# Patient Record
Sex: FEMALE
[De-identification: names, ages, dates, MRNs, and addresses within clinical notes are randomized per-mention and may not be internally consistent; named-entity substitution may affect disease eponyms.]

---

## 2018-12-12 ENCOUNTER — HOSPITAL ENCOUNTER (EMERGENCY)
Dept: HOSPITAL 97 - ER | Age: 48
Discharge: HOME | End: 2018-12-12
Payer: COMMERCIAL

## 2018-12-12 DIAGNOSIS — R51: Primary | ICD-10-CM

## 2018-12-12 LAB
ALBUMIN SERPL BCP-MCNC: 3.1 G/DL (ref 3.4–5)
ALP SERPL-CCNC: 83 U/L (ref 45–117)
ALT SERPL W P-5'-P-CCNC: 18 U/L (ref 12–78)
AST SERPL W P-5'-P-CCNC: 10 U/L (ref 15–37)
BUN BLD-MCNC: 10 MG/DL (ref 7–18)
GLUCOSE SERPLBLD-MCNC: 125 MG/DL (ref 74–106)
HCT VFR BLD CALC: 37.7 % (ref 36–45)
LYMPHOCYTES # SPEC AUTO: 2.2 K/UL (ref 0.7–4.9)
MCH RBC QN AUTO: 28.4 PG (ref 27–35)
MCV RBC: 83.2 FL (ref 80–100)
PMV BLD: 8.9 FL (ref 7.6–11.3)
POTASSIUM SERPL-SCNC: 4 MMOL/L (ref 3.5–5.1)
RBC # BLD: 4.53 M/UL (ref 3.86–4.86)
TROPONIN (EMERG DEPT USE ONLY): < 0.02 NG/ML (ref 0–0.04)

## 2018-12-12 PROCEDURE — 71045 X-RAY EXAM CHEST 1 VIEW: CPT

## 2018-12-12 PROCEDURE — 36415 COLL VENOUS BLD VENIPUNCTURE: CPT

## 2018-12-12 PROCEDURE — 71275 CT ANGIOGRAPHY CHEST: CPT

## 2018-12-12 PROCEDURE — 86308 HETEROPHILE ANTIBODY SCREEN: CPT

## 2018-12-12 PROCEDURE — 84484 ASSAY OF TROPONIN QUANT: CPT

## 2018-12-12 PROCEDURE — 93005 ELECTROCARDIOGRAM TRACING: CPT

## 2018-12-12 PROCEDURE — 80048 BASIC METABOLIC PNL TOTAL CA: CPT

## 2018-12-12 PROCEDURE — 70450 CT HEAD/BRAIN W/O DYE: CPT

## 2018-12-12 PROCEDURE — 80076 HEPATIC FUNCTION PANEL: CPT

## 2018-12-12 PROCEDURE — 85025 COMPLETE CBC W/AUTO DIFF WBC: CPT

## 2018-12-12 PROCEDURE — 85379 FIBRIN DEGRADATION QUANT: CPT

## 2018-12-12 PROCEDURE — 87804 INFLUENZA ASSAY W/OPTIC: CPT

## 2018-12-12 NOTE — ER
Nurse's Notes                                                                                     

 [facility]                                                                

Name: Kathy Power                                                                            

Age: 48 yrs                                                                                       

Sex: Female                                                                                       

: 1970                                                                                   

MRN: [id number]                                                                                   

Arrival Date: 2018                                                                          

Time: 14:01                                                                                       

Account#: [id number]                                                                            

Bed 7                                                                                             

Private MD:                                                                                       

Diagnosis: Chest pain, unspecified;Headache                                                       

                                                                                                  

Presentation:                                                                                     

                                                                                             

14:03 Presenting complaint: Patient states: headache that began this morning around 0700 and  aa5 

      chest pain that began 2 hours PTA. Pt reports nausea, denies vomiting, denies SOB.          

14:03 Method Of Arrival: Ambulatory                                                           aa5 

14:03 Transition of care: patient was not received from another setting of care. Onset of     aa5 

      symptoms was 2018. Risk Assessment: Do you want to hurt yourself or            

      someone else? Patient reports no desire to harm self or others. Initial Sepsis Screen:      

      Does the patient meet any 2 criteria? No. Patient's initial sepsis screen is negative.      

      Does the patient have a suspected source of infection? No. Patient's initial sepsis         

      screen is negative. Care prior to arrival: None.                                            

14:03 Acuity: MADELYN 3                                                                           aa5 

                                                                                                  

Triage Assessment:                                                                                

14:09 General: Appears in no apparent distress. uncomfortable, Behavior is calm, cooperative, hj  

      appropriate for age. Pain: Complains of pain in chest Pain currently is 8 out of 10 on      

      a pain scale. Cardiovascular: Reports chest pain.                                           

                                                                                                  

OB/GYN:                                                                                           

14:05 LMP 2018                                                                          aa5 

                                                                                                  

Historical:                                                                                       

- Allergies:                                                                                      

14:04 No Known Allergies;                                                                     aa5 

- Home Meds:                                                                                      

14:04 None [Active];                                                                          aa5 

- PMHx:                                                                                           

14:04 None;                                                                                   aa5 

- PSHx:                                                                                           

14:04 gastric sleeve; Cholecystectomy;                                                        aa5 

                                                                                                  

- Immunization history:: Adult Immunizations unknown.                                             

- Social history:: Smoking status: Patient/guardian denies using tobacco.                         

- Ebola Screening: : No symptoms or risks identified at this time.                                

- Family history:: not pertinent.                                                                 

- Hospitalizations: : No recent hospitalization is reported.                                      

                                                                                                  

                                                                                                  

Screenin:09 Abuse screen: Denies threats or abuse. Denies injuries from another. Nutritional        hj  

      screening: No deficits noted. Tuberculosis screening: No symptoms or risk factors           

      identified. Fall Risk None identified.                                                      

                                                                                                  

Assessment:                                                                                       

14:00 General: Appears in no apparent distress. uncomfortable, Behavior is calm, cooperative, hj  

      appropriate for age. Neuro: Level of Consciousness is awake, alert, obeys commands,         

      Oriented to person, place, time, situation, Appropriate for age. Cardiovascular:            

      Reports chest pain. Respiratory: Airway is patent Respiratory effort is even,               

      unlabored, Respiratory pattern is regular, symmetrical. GI: No signs and/or symptoms        

      were reported involving the gastrointestinal system. : No signs and/or symptoms were      

      reported regarding the genitourinary system. EENT: No signs and/or symptoms were            

      reported regarding the EENT system. Derm: No signs and/or symptoms reported regarding       

      the dermatologic system. Musculoskeletal: No signs and/or symptoms reported regarding       

      the musculoskeletal system.                                                                 

14:10 Pain: Pain does not radiate. Pain began.                                                hj  

15:10 Reassessment: Patient and/or family updated on plan of care and expected duration. Pain hj  

      level reassessed. Patient is alert, oriented x 3, equal unlabored respirations, skin        

      warm/dry/pink. Patient states feeling better. Patient states symptoms have improved.        

15:55 Reassessment: Patient and/or family updated on plan of care and expected duration. Pain hj  

      level reassessed. Patient is alert, oriented x 3, equal unlabored respirations, skin        

      warm/dry/pink. awaiting POC:.                                                               

                                                                                                  

Vital Signs:                                                                                      

14:05  / 63; Pulse 65; Resp 16 S; Temp 97.0(TE); Pulse Ox 99% on R/A; Weight 99.79 kg   aa5 

      (R); Height 5 ft. 7 in. (170.18 cm) (R); Pain 8/10;                                         

15:55  / 70; Pulse 69; Resp 18; Pulse Ox 100% on R/A;                                   hj  

16:25  / 69; Pulse 69; Resp 18; Pulse Ox 100% on R/A;                                   hj  

14:05 Body Mass Index 34.46 (99.79 kg, 170.18 cm)                                             aa5 

                                                                                                  

ED Course:                                                                                        

14:01 Patient arrived in ED.                                                                  mr  

14:03 Arm band placed on.                                                                     aa5 

14:06 Vikram Mena, RN is Primary Nurse.                                                    hj  

14:07 Triage completed.                                                                       aa5 

14:10 Patient has correct armband on for positive identification. Placed in gown. Bed in low  hj  

      position. Call light in reach. Side rails up X 1. Cardiac monitor on.                       

14:10 Patient maintains SpO2 saturation greater than 95% on room air.                         hj  

14:13 Mak, Ariel, MD is Attending Physician.                                                rn  

14:20 Initial lab(s) drawn, by me, sent to lab. Inserted saline lock: 22 gauge in right       hj  

      antecubital area, using aseptic technique. Blood collected.                                 

14:42 EKG done, by EKG tech. reviewed by Ariel Mak MD.                                      at1 

14:44 CT Head Brain wo Cont In Process Unspecified.                                           EDMS

14:51 X-ray completed. Patient tolerated procedure well. Patient moved back from radiology.   mh1 

14:52 XRAY Chest (1 view) In Process Unspecified.                                             EDMS

15:09 Notified ED physician of a critical lab result(s). d-dimer 511.                         dm5 

15:26 Patient moved to CT.                                                                    mw3 

15:33 CT completed. Patient tolerated procedure well. Patient moved back from CT.             mw3 

15:34 CT Chest For PE Angio In Process Unspecified.                                           EDMS

16:24 No provider procedures requiring assistance completed. IV discontinued, intact,         hj  

      bleeding controlled, No redness/swelling at site. Pressure dressing applied.                

                                                                                                  

Administered Medications:                                                                         

No medications were administered                                                                  

                                                                                                  

                                                                                                  

Outcome:                                                                                          

16:06 Discharge ordered by MD.                                                                rn  

16:25 Discharged to home ambulatory, with family.                                             hj  

16:25 Condition: stable                                                                           

16:25 Discharge instructions given to patient, family, Instructed on discharge instructions,      

      follow up and referral plans. Demonstrated understanding of instructions, follow-up         

      care.                                                                                       

16:26 Patient left the ED.                                                                      

                                                                                                  

Signatures:                                                                                       

Dispatcher [employer]                           EDMS                                                 

Kathleen Tirado, RN                    RN   dm5                                                  

Keya Walsh Martha                               mh1                                                  

Ariel Mak MD MD rn Calderon, Audri RN                     RN   5                                                  

Katie Perez, EKG Tech              EKG Tat1                                                  

Vikram Mena RN RN hj Willis, Michelle                             mw3                                                  

                                                                                                  

**************************************************************************************************

## 2018-12-12 NOTE — RAD REPORT
EXAM DESCRIPTION:  CT - Chest For Pe Angio - 12/12/2018 3:34 pm

 

CLINICAL HISTORY:  Chest pain.

CHEST PAIN

 

COMPARISON:  No comparisons

 

TECHNIQUE:  CT angiogram of the pulmonary arteries was performed with MIP.

 

All CT scans are performed using dose optimization technique as appropriate and may include automated
 exposure control or mA/KV adjustment according to patient size.

 

FINDINGS:  No evidence of pulmonary thromboembolism.

 

No acute aortic finding demonstrated.

 

The lungs are clear.

 

No significant pericardial or pleural fluid.

 

No concerning bony finding. Postsurgical changes about the stomach noted.

 

IMPRESSION:  No evidence of pulmonary thromboembolism.

 

No acute lung findings.

## 2018-12-12 NOTE — EDPHYS
Physician Documentation                                                                           

 [facility]                                                                

Name: Kathy Power                                                                            

Age: 48 yrs                                                                                       

Sex: Female                                                                                       

: 1970                                                                                   

MRN: [id number]                                                                                   

Arrival Date: 2018                                                                          

Time: 14:01                                                                                       

Account#: [id number]                                                                            

Bed 7                                                                                             

Private MD:                                                                                       

ED Physician Ariel Mak                                                                         

HPI:                                                                                              

                                                                                             

15:58 This 48 yrs old  Female presents to ER via Ambulatory with complaints of Chest  rn  

      Pain, headache.                                                                             

15:59 The patient or guardian reports chest pain that is located primarily in the anterior    rn  

      chest wall, left. Onset: this morning. The pain does not radiate. Associated signs and      

      symptoms: Pertinent positives: headache, Pertinent negatives: abdominal pain, cough,        

      lower extremity swelling, lightheadedness, nausea, near syncope, palpitations, recent       

      travel, shortness of breath, syncope, vomiting. The chest pain is described as dull.        

      Duration: The patient or guardian reports multiple episodes, that are intermittent.         

      Severity of pain: At its worst the pain was mild in the emergency department the pain       

      has improved. The patient has not experienced similar symptoms in the past. Reports         

      this morning began with headache and chest "thumping", reports under a lot of stress        

      lately, no fever/cough/sob/trauma/abd pain/vomiting/diarrhea. Reports admitted here for     

      chest pain 1-2 years ago for chest pain, had neg stress and ECHO..                          

                                                                                                  

OB/GYN:                                                                                           

14:05 LMP 2018                                                                          aa5 

                                                                                                  

Historical:                                                                                       

- Allergies:                                                                                      

14:04 No Known Allergies;                                                                     aa5 

- Home Meds:                                                                                      

14:04 None [Active];                                                                          aa5 

- PMHx:                                                                                           

14:04 None;                                                                                   aa5 

- PSHx:                                                                                           

14:04 gastric sleeve; Cholecystectomy;                                                        aa5 

                                                                                                  

- Immunization history:: Adult Immunizations unknown.                                             

- Social history:: Smoking status: Patient/guardian denies using tobacco.                         

- Ebola Screening: : No symptoms or risks identified at this time.                                

- Family history:: not pertinent.                                                                 

- Hospitalizations: : No recent hospitalization is reported.                                      

                                                                                                  

                                                                                                  

ROS:                                                                                              

15:59 Constitutional: Negative for fever, chills, and weight loss, Eyes: Negative for injury, rn  

      pain, redness, and discharge, Neck: Negative for injury, pain, and swelling,                

      Cardiovascular: Negative for palpitations, and edema, Respiratory: Negative for             

      shortness of breath, cough, wheezing, and pleuritic chest pain, Abdomen/GI: Negative        

      for abdominal pain, nausea, vomiting, diarrhea, and constipation, MS/Extremity:             

      Negative for injury and deformity, Skin: Negative for injury, rash, and discoloration,      

      Neuro: Negative for weakness, numbness, tingling, and seizure.                              

                                                                                                  

Exam:                                                                                             

15:59 Constitutional:  This is a well developed, well nourished patient who is awake, alert,  rn  

      appears anxious Head/Face:  Normocephalic, atraumatic. Eyes:  Pupils equal round and        

      reactive to light, extra-ocular motions intact.  Lids and lashes normal.  Conjunctiva       

      and sclera are non-icteric and not injected.  Cornea within normal limits.  Periorbital     

      areas with no swelling, redness, or edema. ENT:  MMM, no stridor Cardiovascular:            

      Regular rate and rhythm with a normal S1 and S2.  No gallops, murmurs, or rubs.  No         

      JVD.  No pulse deficits. Respiratory:  Lungs have equal breath sounds bilaterally,          

      clear to auscultation.  No rales, rhonchi or wheezes noted.  No increased work of           

      breathing, no retractions or nasal flaring. Abdomen/GI:  soft, non-tender MS/               

      Extremity:  Pulses equal, no cyanosis.  Neurovascular intact.  Full, normal range of        

      motion.  Equal circumference. Neuro:  Awake and alert, GCS 15, oriented to person,          

      place, time, and situation.  Cranial nerves II-XII grossly intact.  Motor strength 5/5      

      in all extremities.  Sensory grossly intact.  Cerebellar exam normal.                       

                                                                                                  

Vital Signs:                                                                                      

14:05  / 63; Pulse 65; Resp 16 S; Temp 97.0(TE); Pulse Ox 99% on R/A; Weight 99.79 kg   aa5 

      (R); Height 5 ft. 7 in. (170.18 cm) (R); Pain 8/10;                                         

15:55  / 70; Pulse 69; Resp 18; Pulse Ox 100% on R/A;                                   hj  

16:25  / 69; Pulse 69; Resp 18; Pulse Ox 100% on R/A;                                   hj  

14:05 Body Mass Index 34.46 (99.79 kg, 170.18 cm)                                             aa5 

                                                                                                  

MDM:                                                                                              

14:13 Patient medically screened.                                                             rn  

15:59 Differential diagnosis: acute pericarditis, anxiety, coronary artery disease chest wall rn  

      pain, costochondritis, gastritis, gastroesophageal reflux disease (GERD), pleurisy,         

      pneumonia, pneumothorax, pulmonary embolus. Data reviewed: vital signs, nurses notes,       

      lab test result(s), EKG, radiologic studies, CT scan, plain films, and as a result, I       

      will discharge patient. Counseling: I had a detailed discussion with the patient and/or     

      guardian regarding: the historical points, exam findings, and any diagnostic results        

      supporting the discharge/admit diagnosis, lab results, radiology results, the need for      

      outpatient follow up, to return to the emergency department if symptoms worsen or           

      persist or if there are any questions or concerns that arise at home. Response to           

      treatment: the patient's symptoms have markedly improved after treatment, and as a          

      result, I will discharge patient. Special discussion: I discussed with the                  

      patient/guardian in detail that at this point there is no indication for admission to       

      the hospital. It is understood, however, that if the symptoms persist or worsen the         

      patient needs to return immediately for re-evaluation. Based on the history and exam        

      findings, there is no indication for further emergent testing or inpatient evaluation.      

      I discussed with the patient/guardian the need to see the primary care provider for         

      further evaluation of the symptoms.                                                         

                                                                                                  

                                                                                             

14:23 Order name: Basic Metabolic Panel; Complete Time: 15:                                 rn  

                                                                                             

14:23 Order name: CBC with Diff; Complete Time: 15:                                         rn  

                                                                                             

14:23 Order name: LFT's; Complete Time: 15:                                                 rn  

                                                                                             

14:23 Order name: Troponin (emerg Dept Use Only); Complete Time: 15:                        rn  

                                                                                             

14:23 Order name: D-Dimer; Complete Time: 15:                                               rn  

                                                                                             

14:23 Order name: Flu; Complete Time: 15:                                                   rn  

                                                                                             

14:23 Order name: XRAY Chest (1 view); Complete Time: 15:04                                   rn  

                                                                                             

14:23 Order name: EKG; Complete Time: 14:24                                                   rn  

                                                                                             

14:23 Order name: Cardiac monitoring; Complete Time: 14:26                                    rn  

                                                                                             

14:23 Order name: EKG - Nurse/Tech; Complete Time: 14:51                                      rn  

                                                                                             

14:23 Order name: IV Saline Lock; Complete Time: 14:                                        rn  

                                                                                             

14:23 Order name: CT Head Brain wo Cont; Complete Time: 15:04                                 rn  

                                                                                             

14:23 Order name: Mono Screen Profile; Complete Time: 15:51                                   rn  

                                                                                             

15:12 Order name: CT Chest For PE Angio; Complete Time: 15:51                                 rn  

                                                                                             

14:23 Order name: Labs collected and sent; Complete Time: 14:26                               rn  

                                                                                             

14:23 Order name: O2 Per Protocol; Complete Time: 14:26                                       rn  

                                                                                             

14:23 Order name: O2 Sat Monitoring; Complete Time: 14:27                                     rn  

                                                                                                  

Administered Medications:                                                                         

No medications were administered                                                                  

                                                                                                  

                                                                                                  

Disposition:                                                                                      

18 16:06 Discharged to Home. Impression: Chest pain, unspecified, Headache.                 

- Condition is Stable.                                                                            

- Discharge Instructions: Nonspecific Chest Pain, General Headache Without Cause,                 

  Stress and Stress Management.                                                                   

                                                                                                  

- Medication Reconciliation Form, Thank You Letter, Antibiotic Education, Prescription            

  Opioid Use form.                                                                                

- Follow up: Private Physician; When: As needed; Reason: Recheck today's complaints,              

  Re-evaluation by your physician.                                                                

- Problem is new.                                                                                 

- Symptoms have improved.                                                                         

                                                                                                  

                                                                                                  

                                                                                                  

Signatures:                                                                                       

Dispatcher [employer]                           EDMS                                                 

Ariel Mak MD MD rn Calderon, Audri, RN                     RN   aa5                                                  

Vikram Mena RN RN   hj                                                   

                                                                                                  

Corrections: (The following items were deleted from the chart)                                    

16:26 16:06 2018 16:06 Discharged to Home. Impression: Chest pain, unspecified;         hj  

      Headache. Condition is Stable. Forms are Medication Reconciliation Form, Thank You          

      Letter, Antibiotic Education, Prescription Opioid Use. Follow up: Private Physician;        

      When: As needed; Reason: Recheck today's complaints, Re-evaluation by your physician.       

      Problem is new. Symptoms have improved. rn                                                  

                                                                                                  

**************************************************************************************************

## 2018-12-12 NOTE — XMS REPORT
[facility]

 Created on:2018



Patient:Kathy Power

Sex:Female

:1970

External Reference #:[id number]





Demographics







 Address  [address] [place] [address]

 

 Phone  [phone]

 

 Preferred Language  en

 

 Marital Status  Unknown

 

 [employer] Affiliation  Unknown

 

 Race  Other Race

 

 Ethnic Group  Unknown









Author







 Organization  [employer]









Care Team Providers







 Name  Role  Phone

 

 Fazal Min  Provider Role  Unavailable









Allergies, Adverse Reactions, Alerts







 Substance  Reaction  Event Type

 

 N.K.D.A.  Info Not Available  Non Drug Allergy







Problems







 Problem Type  Condition  Code  Onset Dates  Condition Status

 

 Assessment  Body mass index (BMI) of 34.0-34.9  Z68.34    Active



   in adult      

 

 Assessment  Encounter for screening mammogram  Z12.31    Active



   for breast cancer      

 

 Assessment  Other obesity due to excess  E66.09    Active



   calories      

 

 Problem  Other obesity due to excess  E66.09    Active



   calories      

 

 Problem  Encounter for screening mammogram  Z12.31    Active



   for breast cancer      

 

 Problem  Abnormal mammogram  R92.8    Active

 

 Assessment  Encounter for gynecological  Z01.419    Active



   examination without abnormal      



   finding      

 

 Problem  Encounter for gynecological  Z01.419    Active



   examination without abnormal      



   finding      

 

 Problem  Body mass index (BMI) of 34.0-34.9  Z68.34    Active



   in adult      







Medications

No Known Medications



Results







 Name  Result  Date  Reference Range  Unit  Abnormality Flag

 

 URINALYSIS AUTO W/O SCOPE          



 (39111)          

 

 ----NIT  neg  [id number]      

 

 ----URO  0.2  [id number]      

 

 ----PROTEIN  neg  [id number]      

 

 ----pH  6.5  [id number]      

 

 ----BLO  trace-intact  [id number]      

 

 ----GLUCOSE  neg  [id number]      

 

 ----PARRISH  neg  [id number]      

 

 ----BILIRUBIN  neg  [id number]      

 

 ----KETONES  neg  [id number]      

 

 ----SPECIFIC GRAVITY  1.015  [id number]      







Summary Purpose

[employer] Submission

## 2018-12-12 NOTE — RAD REPORT
EXAM DESCRIPTION:  RAD - Chest Single View - 12/12/2018 2:53 pm

 

CLINICAL HISTORY:  CHEST PAIN

Chest pain.

 

COMPARISON:  Chest Single View dated 5/22/2016

 

FINDINGS:  Portable technique limits examination quality.

 

The lungs are grossly clear. The heart is normal in size. No displaced fractures.

 

IMPRESSION:  No acute intrathoracic process suspected.

## 2018-12-12 NOTE — RAD REPORT
EXAM DESCRIPTION:  CT - Head Brain Wo Cont - 12/12/2018 2:45 pm

 

CLINICAL HISTORY:  Headache;Dizziness

Drowsiness

 

COMPARISON:  No comparisons

 

TECHNIQUE:  All CT scans are performed using dose optimization technique as appropriate and may inclu
de automated exposure control or mA/KV adjustment according to patient size.

 

FINDINGS:  No intracranial hemorrhage, hydrocephalus or extra-axial fluid collection.No areas of brai
n edema or evidence of midline shift.

 

The paranasal sinuses and mastoids are clear. The calvarium is intact.

 

IMPRESSION:  No acute intracranial abnormality.

## 2018-12-13 NOTE — EKG
Test Date:    2018-12-12               Test Time:    14:35:43

Technician:   YORDY                                     

                                                     

MEASUREMENT RESULTS:                                       

Intervals:                                           

Rate:         57                                     

KY:           156                                    

QRSD:         76                                     

QT:           436                                    

QTc:          424                                    

Axis:                                                

P:            52                                     

KY:           156                                    

QRS:          12                                     

T:            43                                     

                                                     

INTERPRETIVE STATEMENTS:                                       

                                                     

Sinus bradycardia

Otherwise normal ECG

Compared to ECG 05/22/2016 11:31:45

Sinus rhythm no longer present



Electronically Signed On 12-13-18 05:15:19 CST by Nathaniel Scruggs

## 2022-05-05 ENCOUNTER — HOSPITAL ENCOUNTER (OUTPATIENT)
Dept: HOSPITAL 97 - ER | Age: 52
Setting detail: OBSERVATION
LOS: 1 days | Discharge: HOME | End: 2022-05-06
Attending: SURGERY | Admitting: SURGERY
Payer: COMMERCIAL

## 2022-05-05 VITALS — BODY MASS INDEX: 38.8 KG/M2

## 2022-05-05 DIAGNOSIS — N61.1: Primary | ICD-10-CM

## 2022-05-05 DIAGNOSIS — Z90.49: ICD-10-CM

## 2022-05-05 DIAGNOSIS — Z98.84: ICD-10-CM

## 2022-05-05 DIAGNOSIS — Z83.3: ICD-10-CM

## 2022-05-05 DIAGNOSIS — K21.9: ICD-10-CM

## 2022-05-05 DIAGNOSIS — E66.9: ICD-10-CM

## 2022-05-05 DIAGNOSIS — Z20.822: ICD-10-CM

## 2022-05-05 LAB
ALBUMIN SERPL BCP-MCNC: 3.1 G/DL (ref 3.4–5)
ALP SERPL-CCNC: 75 U/L (ref 45–117)
ALT SERPL W P-5'-P-CCNC: 28 U/L (ref 12–78)
AST SERPL W P-5'-P-CCNC: 14 U/L (ref 15–37)
BUN BLD-MCNC: 13 MG/DL (ref 7–18)
GLUCOSE SERPLBLD-MCNC: 86 MG/DL (ref 74–106)
HCT VFR BLD CALC: 35.8 % (ref 36–45)
LYMPHOCYTES # SPEC AUTO: 3.1 K/UL (ref 0.7–4.9)
PMV BLD: 8.3 FL (ref 7.6–11.3)
POTASSIUM SERPL-SCNC: 3.9 MMOL/L (ref 3.5–5.1)
RBC # BLD: 4.45 M/UL (ref 3.86–4.86)

## 2022-05-05 PROCEDURE — 87070 CULTURE OTHR SPECIMN AEROBIC: CPT

## 2022-05-05 PROCEDURE — 85025 COMPLETE CBC W/AUTO DIFF WBC: CPT

## 2022-05-05 PROCEDURE — 87075 CULTR BACTERIA EXCEPT BLOOD: CPT

## 2022-05-05 PROCEDURE — 71045 X-RAY EXAM CHEST 1 VIEW: CPT

## 2022-05-05 PROCEDURE — 99284 EMERGENCY DEPT VISIT MOD MDM: CPT

## 2022-05-05 PROCEDURE — 96375 TX/PRO/DX INJ NEW DRUG ADDON: CPT

## 2022-05-05 PROCEDURE — 87205 SMEAR GRAM STAIN: CPT

## 2022-05-05 PROCEDURE — 10060 I&D ABSCESS SIMPLE/SINGLE: CPT

## 2022-05-05 PROCEDURE — 80053 COMPREHEN METABOLIC PANEL: CPT

## 2022-05-05 PROCEDURE — 36415 COLL VENOUS BLD VENIPUNCTURE: CPT

## 2022-05-05 PROCEDURE — 93005 ELECTROCARDIOGRAM TRACING: CPT

## 2022-05-05 PROCEDURE — 96365 THER/PROPH/DIAG IV INF INIT: CPT

## 2022-05-05 PROCEDURE — 0HBT0ZX EXCISION OF RIGHT BREAST, OPEN APPROACH, DIAGNOSTIC: ICD-10-PCS

## 2022-05-05 PROCEDURE — 88304 TISSUE EXAM BY PATHOLOGIST: CPT

## 2022-05-05 PROCEDURE — 19101 BIOPSY OF BREAST OPEN: CPT

## 2022-05-05 PROCEDURE — 80048 BASIC METABOLIC PNL TOTAL CA: CPT

## 2022-05-05 PROCEDURE — 0H9T0ZZ DRAINAGE OF RIGHT BREAST, OPEN APPROACH: ICD-10-PCS

## 2022-05-05 PROCEDURE — 96361 HYDRATE IV INFUSION ADD-ON: CPT

## 2022-05-05 RX ADMIN — SODIUM CHLORIDE SCH MLS: 0.9 INJECTION, SOLUTION INTRAVENOUS at 21:32

## 2022-05-05 NOTE — P.HP
Date of Service: 05/05/22


Chief complaint: Right breast pain





History of present Illness: Patient is a 51-year-old female who for the last 

week and a half has had a increasing mass associated with the redness, warmth 

and tenderness.  Patient had a mammogram and ultrasound done yesterday which was

suspicious for an abscess.  She went to her gynecologist's office today for 

follow-up and was found to have an abscess.  Patient presented to the emergency 

room for evaluation.  Her blood work is pending.  Patient denies any sore 

throat, runny nose, cough, headaches, dizziness, chest pain or fever or chills. 

Patient denies any nipple discharge or skin color changes.  Patient denies any 

trauma or insect bites.  Patient denies family history of breast cancer.





Review of systems: Otherwise unremarkable





Past medical history: Negative





Past surgical history: Gastric sleeve surgery and cholecystectomy





Allergies: None





Social history: Patient denies smoking tobacco or drinking alcohol





Family history: Diabetes





Vital signs: Stable, afebrile





Physical exam:


Awake alert oriented x3


Head and neck: Cranial nerves II through XII grossly within normal limits, th

roat clear, neck supple, no JVD and no neck masses


Chest: Clear


Heart: S1-S2


Abdomen: Soft


Extremity: Neurovascularly intact and nontender with full range of motion


Neuro: Nonfocal


Axilla: No masses


Breast: No nipple discharge, no skin color changes except on the right breast in

the upper outer area approximately 5 x 5 cm with redness, warmth and central 

fluctuance with surrounding induration.  No mass appreciated in the left breast.





Diagnostic data: Mammogram and ultrasound reviewedpatient has a complex cyst 

that was seen yesterday, approximately 2.9 cm in the right breast.  Patient's 

white count is slightly elevated 11.4





Assessment: Right breast abscess with cellulitis





Plan/recommendation: Admit, n.p.o., IV fluids, IV antibiotics and to the OR for 

incision and drainage and debridement of right breast abscess.  We will also do 

a biopsy of the abscess cavity.  Patient understands risk, benefits, 

alternatives and agrees to procedure.  Plan of care discussed with patient in 

detail.





CC: Dr. Louis's office

## 2022-05-05 NOTE — ER
Nurse's Notes                                                                                     

 [facility]                                                                 

Name: Kathy Power                                                                            

Age: 51 yrs                                                                                       

Sex: Female                                                                                       

: 1970                                                                                   

MRN: [id number]                                                                                   

Arrival Date: 2022                                                                          

Time: 16:16                                                                                       

Account#: [id number]                                                                            

Bed 20                                                                                            

Private MD: Julian Mclaughlin E                                                                     

Diagnosis: Cutaneous abscess, unspecified-right breast 3 cm                                       

                                                                                                  

Presentation:                                                                                     

                                                                                             

16:35 Chief complaint: Patient states: "About 2 weeks ago I noticed a lump on the right       ab2 

      breast so I called my doctor and she scheduled a mammogram and ultrasound, so I had         

      that done yesterday. The lump has since tripled in size and my doctor told me to come       

      in." Pt c/o right breast pain. Coronavirus screen: Vaccine status: Patient reports          

      receiving the 2nd dose of the covid vaccine. Client denies travel out of the [place] in        

      the last 14 days. At this time, the client does not indicate any symptoms associated        

      with coronavirus-19. Ebola Screen: Patient negative for fever greater than or equal to      

      101.5 degrees Fahrenheit, and additional compatible Ebola Virus Disease symptoms            

      Patient denies exposure to infectious person. Patient denies travel to an                   

      Ebola-affected area in the 21 days before illness onset. No symptoms or risks               

      identified at this time. Initial Sepsis Screen: Does the patient meet any 2 criteria?       

      No. Patient's initial sepsis screen is negative. Does the patient have a suspected          

      source of infection? No. Patient's initial sepsis screen is negative. Risk Assessment:      

      Do you want to hurt yourself or someone else? Patient reports no desire to harm self or     

      others. Onset of symptoms is unknown.                                                       

16:35 Method Of Arrival: Ambulatory                                                           ab2 

16:35 Acuity: MADELYN 3                                                                           ab2 

                                                                                                  

Triage Assessment:                                                                                

16:37 General: Appears in no apparent distress. uncomfortable, Behavior is calm, cooperative, ab2 

      appropriate for age. Pain: Complains of pain in right breast Pain currently is 4 out of     

      10 on a pain scale. Neuro: Level of Consciousness is awake, alert, obeys commands,          

      Oriented to person, place, time, situation, Appropriate for age  are equal             

      bilaterally. Respiratory: Airway is patent Respiratory effort is even, unlabored.           

                                                                                                  

Historical:                                                                                       

- Allergies:                                                                                      

16:37 No Known Allergies;                                                                     ab2 

- PMHx:                                                                                           

16:37 None;                                                                                   ab2 

                                                                                                  

- Immunization history:: Adult Immunizations up to date.                                          

- Social history:: Smoking status: Patient denies any tobacco usage or history of.                

- Family history:: not pertinent.                                                                 

                                                                                                  

                                                                                                  

Assessment:                                                                                       

19:10 Reassessment: Patient appears in no apparent distress at this time. Patient and/or      jb4 

      family updated on plan of care and expected duration. Pain level reassessed. Patient is     

      alert, oriented x 3, equal unlabored respirations, skin warm/dry/pink.                      

20:00 Reassessment: Patient appears in no apparent distress at this time. Patient and/or      jb4 

      family updated on plan of care and expected duration. Pain level reassessed. Patient is     

      alert, oriented x 3, equal unlabored respirations, skin warm/dry/pink.                      

21:00 Reassessment: Patient appears in no apparent distress at this time. Patient and/or      jb4 

      family updated on plan of care and expected duration. Pain level reassessed. Patient is     

      alert, oriented x 3, equal unlabored respirations, skin warm/dry/pink.                      

                                                                                                  

Vital Signs:                                                                                      

16:35  / 66; Pulse 73; Resp 17; Temp 98.7; Pulse Ox 99% on R/A; Weight 112.49 kg;       ab2 

      Height 5 ft. 7 in. (170.18 cm); Pain 4/10;                                                  

16:57  / 50; Pulse 68; Resp 16; Temp 98; Pulse Ox 100% ; Weight 112.49 kg; Height 5 ft. zm  

      7 in. (170.18 cm);                                                                          

16:57 Body Mass Index 38.84 (112.49 kg, 170.18 cm)                                              

                                                                                                  

ED Course:                                                                                        

16:16 Patient arrived in ED.                                                                  am2 

16:17 Julian Mclaughlin MD is Private Physician.                                                am2 

16:18 Rubén Francois MD is Attending Physician.                                             robert 

16:37 Triage completed.                                                                       ab2 

16:38 Arm band placed on right wrist.                                                         ab2 

16:43 Jordan Baker, RN is Primary Nurse.                                                    bp  

17:20 Inserted saline lock: 22 gauge in left forearm, using aseptic technique. Blood          bp  

      collected.                                                                                  

17:31 Dylon Motta MD is Hospitalizing Provider.                                              robert 

17:50 Chest Single View XRAY In Process Unspecified.                                          EDMS

                                                                                                  

Administered Medications:                                                                         

17:20 Drug: NS 0.9% 1000 ml Route: IV; Rate: 1 bolus; Site: left forearm;                     bp  

19:11 Follow up: IV Status: Completed infusion; IV Intake: 1000ml                             bp  

17:20 Drug: morphine 2 mg Route: IVP; Site: left forearm;                                     bp  

18:02 Follow up: Response: Pain is decreased                                                  bp  

17:20 Drug: Zofran (Ondansetron) 4 mg Route: IVP; Site: left forearm;                         bp  

18:02 Follow up: Response: No adverse reaction                                                bp  

17:45 Drug: Ancef (cefazolin) 2 grams Route: IVPB; Infused Over: 30 mins; Site: left forearm; bp  

18:02 Follow up: IV Status: Completed infusion; IV Intake: 100ml                              bp  

                                                                                                  

                                                                                                  

Intake:                                                                                           

18:02 IV: 100ml; Total: 100ml.                                                                bp  

19:11 IV: 1000ml; Total: 1100ml.                                                              bp  

                                                                                                  

Outcome:                                                                                          

17:32 Decision to Hospitalize by Provider.                                                    robert 

                                                                                             

06:55 Patient left the ED.                                                                    [id number] 

                                                                                                  

Signatures:                                                                                       

Dispatcher [employer]                           EDRubén Morton MD MD cha Pena, Laura, RN                         RN   [id number]                                                  

Laureano Frazier RN                       RN   jb4                                                  

Katie Centeno Brian, NORMA                      RN   Scott Aguiar Zaina zm                                                   

                                                                                                  

**************************************************************************************************

## 2022-05-05 NOTE — EDPHYS
Physician Documentation                                                                           

 [facility]                                                                 

Name: Kathy Power                                                                            

Age: 51 yrs                                                                                       

Sex: Female                                                                                       

: 1970                                                                                   

MRN: [id number]                                                                                   

Arrival Date: 2022                                                                          

Time: 16:16                                                                                       

Account#: [id number]                                                                            

Bed 20                                                                                            

Private MD: Julian Mclaughlin E                                                                     

ED Physician Rubén Francois                                                                      

HPI:                                                                                              

                                                                                             

17:26 This 51 yrs old  Female presents to ER via Ambulatory with complaints of Cyst - robert 

      right breast.                                                                               

17:26 The patient presents with an abscess of the right breast, The patient presents with     robert 

      cellulitis of the right breast. Description: The affected area is small, confluent.         

      Onset: The symptoms/episode began/occurred 2 week(s) ago. Possible cause(s): unknown.       

      Associated signs and symptoms: The patient has no apparent associated signs or              

      symptoms. Modifying factors: the symptoms are alleviated by nothing, the symptoms are       

      aggravated by pressure, squeezing the lesion and expressing the contents. Severity of       

      symptoms: At their worst the symptoms were moderate, in the emergency department the        

      symptoms are unchanged. The patient has not experienced similar symptoms in the past.       

                                                                                                  

Historical:                                                                                       

- Allergies:                                                                                      

16:37 No Known Allergies;                                                                     ab2 

- PMHx:                                                                                           

16:37 None;                                                                                   ab2 

                                                                                                  

- Immunization history:: Adult Immunizations up to date.                                          

- Social history:: Smoking status: Patient denies any tobacco usage or history of.                

- Family history:: not pertinent.                                                                 

                                                                                                  

                                                                                                  

ROS:                                                                                              

17:26 Constitutional: Negative for fever, chills, and weight loss, Eyes: Negative for injury, robert 

      pain, redness, and discharge, ENT: Negative for injury, pain, and discharge, Neck:          

      Negative for injury, pain, and swelling, Cardiovascular: Negative for chest pain,           

      palpitations, and edema, Respiratory: Negative for shortness of breath, cough,              

      wheezing, and pleuritic chest pain, Abdomen/GI: Negative for abdominal pain, nausea,        

      vomiting, diarrhea, and constipation, Back: Negative for injury and pain, : Negative      

      for injury, bleeding, discharge, and swelling, MS/Extremity: Negative for injury and        

      deformity, Neuro: Negative for headache, weakness, numbness, tingling, and seizure,         

      Psych: Negative for depression, anxiety, suicide ideation, homicidal ideation, and          

      hallucinations, Allergy/Immunology: Negative for hives, rash, and allergies, Endocrine:     

      Negative for neck swelling, polydipsia, polyuria, polyphagia, and marked weight             

      changes, Hematologic/Lymphatic: Negative for swollen nodes, abnormal bleeding, and          

      unusual bruising.                                                                           

17:26 Skin: Positive for abscess, of the right breast.                                            

                                                                                                  

Exam:                                                                                             

17:26 Constitutional:  This is a well developed, well nourished patient who is awake, alert,  robert 

      and in no acute distress. Head/Face:  Normocephalic, atraumatic. Eyes:  Pupils equal        

      round and reactive to light, extra-ocular motions intact.  Lids and lashes normal.          

      Conjunctiva and sclera are non-icteric and not injected.  Cornea within normal limits.      

      Periorbital areas with no swelling, redness, or edema. ENT:  Nares patent. No nasal         

      discharge, no septal abnormalities noted.  Tympanic membranes are normal and external       

      auditory canals are clear.  Oropharynx with no redness, swelling, or masses, exudates,      

      or evidence of obstruction, uvula midline.  Mucous membranes moist. Neck:  Trachea          

      midline, no thyromegaly or masses palpated, and no cervical lymphadenopathy.  Supple,       

      full range of motion without nuchal rigidity, or vertebral point tenderness.  No            

      Meningismus. Chest/axilla:  Normal chest wall appearance and motion.  Nontender with no     

      deformity.  No lesions are appreciated. Cardiovascular:  Regular rate and rhythm with a     

      normal S1 and S2.  No gallops, murmurs, or rubs.  Normal PMI, no JVD.  No pulse             

      deficits. Respiratory:  Lungs have equal breath sounds bilaterally, clear to                

      auscultation and percussion.  No rales, rhonchi or wheezes noted.  No increased work of     

      breathing, no retractions or nasal flaring. Abdomen/GI:  Soft, non-tender, with normal      

      bowel sounds.  No distension or tympany.  No guarding or rebound.  No evidence of           

      tenderness throughout. Back:  No spinal tenderness.  No costovertebral tenderness.          

      Full range of motion. Pelvic Exam:  Normal external genitalia.  Speculum exam with          

      closed cervical os, no discharge or bleeding noted.  Bimanual exam with normal adnexa,      

      no adnexal or cervical motion tenderness.  Normal uterus. MS/ Extremity:  Pulses equal,     

      no cyanosis.  Neurovascular intact.  Full, normal range of motion. Neuro:  Awake and        

      alert, GCS 15, oriented to person, place, time, and situation.  Cranial nerves II-XII       

      grossly intact.  Motor strength 5/5 in all extremities.  Sensory grossly intact.            

      Cerebellar exam normal.  Normal gait. Psych:  Awake, alert, with orientation to person,     

      place and time.  Behavior, mood, and affect are within normal limits.                       

17:26 Skin: abscess, that is moderate sized, approximately  3 cm(s), cellulitis, that is          

      mild, induration, that is mild is noted.                                                    

18:24 ECG was reviewed by the Attending Physician.                                            [facility] 

                                                                                                  

Vital Signs:                                                                                      

16:35  / 66; Pulse 73; Resp 17; Temp 98.7; Pulse Ox 99% on R/A; Weight 112.49 kg;       ab2 

      Height 5 ft. 7 in. (170.18 cm); Pain 4/10;                                                  

16:57  / 50; Pulse 68; Resp 16; Temp 98; Pulse Ox 100% ; Weight 112.49 kg; Height 5 ft. zm  

      7 in. (170.18 cm);                                                                          

16:57 Body Mass Index 38.84 (112.49 kg, 170.18 cm)                                            zm  

                                                                                                  

MDM:                                                                                              

16:18 Patient medically screened.                                                             [facility] 

17:30 Differential diagnosis: abscess, cellulitis. Data reviewed: vital signs, nurses notes,  [facility] 

      lab test result(s), radiologic studies, plain films, ultrasound. Data interpreted:          

      Cardiac monitor: rate is 68 beats/min, rhythm is regular, Pulse oximetry: on room air       

      is 100 %. Counseling: I had a detailed discussion with the patient and/or guardian          

      regarding: the historical points, exam findings, and any diagnostic results supporting      

      the discharge/admit diagnosis, lab results, radiology results, the need for further         

      work-up and treatment in the hospital.                                                      

                                                                                                  

                                                                                             

16:19 Order name: CBC with Diff; Complete Time: 18:18                                         [facility] 

                                                                                             

16:19 Order name: Comprehensive Metabolic Panel; Complete Time: 18:18                         [facility] 

                                                                                             

17:50 Order name: COVID-19 SARS RT PCR (Document "Date of Onset" if Symptomatic)              bd  

                                                                                             

03:27 Order name: CBC with Automated Diff                                                     [facility]

                                                                                             

03:45 Order name: Basic Metabolic Panel                                                       [facility]

                                                                                             

16:23 Order name: Chest Single View XRAY; Complete Time: 18:18                                [facility] 

                                                                                             

16:23 Order name: EKG; Complete Time: 16:24                                                   [facility] 

                                                                                             

16:23 Order name: EKG - Nurse/Tech; Complete Time: 18:16                                      robert 

                                                                                                  

EC:24 Rate is 63 beats/min. Rhythm is regular. QRS Axis is Normal. HI interval is normal. QRS robert 

      interval is normal. QT interval is normal. No Q waves. T waves are Normal. No ST            

      changes noted. Clinical impression: NSR w/ Non-specific ST/T Changes and No evidence of     

      ischemia. Interpreted by me. Reviewed by me.                                                

                                                                                                  

Administered Medications:                                                                         

17:20 Drug: NS 0.9% 1000 ml Route: IV; Rate: 1 bolus; Site: left forearm;                     bp  

19:11 Follow up: IV Status: Completed infusion; IV Intake: 1000ml                             bp  

17:20 Drug: morphine 2 mg Route: IVP; Site: left forearm;                                     bp  

18:02 Follow up: Response: Pain is decreased                                                  bp  

17:20 Drug: Zofran (Ondansetron) 4 mg Route: IVP; Site: left forearm;                         bp  

18:02 Follow up: Response: No adverse reaction                                                bp  

17:45 Drug: Ancef (cefazolin) 2 grams Route: IVPB; Infused Over: 30 mins; Site: left forearm; bp  

18:02 Follow up: IV Status: Completed infusion; IV Intake: 100ml                              bp  

                                                                                                  

                                                                                                  

Disposition Summary:                                                                              

22 17:32                                                                                    

Hospitalization Ordered                                                                           

      Hospitalization Status: Observation                                                     robert 

      Provider: Dylon Motta cha 

      Condition: Stable                                                                       robert 

      Problem: new                                                                            robert 

      Symptoms: have improved                                                                 robert 

      Bed/Room Type: Standard                                                                 robert 

      Location: [facility] ER HOLD(22 19:52)                                                    

      Room Assignment: ERHOLD-(22 19:52)                                                  

      Diagnosis                                                                                   

        - Cutaneous abscess, unspecified - right breast 3 cm                                  robert 

      Forms:                                                                                      

        - Medication Reconciliation Form                                                      robert 

        - SBAR form                                                                           robert 

Signatures:                                                                                       

Dispatcher [employer]                           EDMS                                                 

Jocelyn Mora, RN                        RN   Rubén Gallo MD MD cha Peltier, Brian RN                      RN   Scott Aguiar                                                  

                                                                                                  

Corrections: (The following items were deleted from the chart)                                    

16:29 16:23 Extrmty Nonvasular Limited+US.RAD.BRZ ordered. EDMS                               EDMS

19:52 17:32 Telemetry/MedSurg (observation) [facility]  

19:52 17:32 robert                                                                                 

                                                                                                  

**************************************************************************************************

## 2022-05-05 NOTE — RAD REPORT
EXAM DESCRIPTION:  RAD - Chest Single View - 5/5/2022 5:48 pm

 

CLINICAL HISTORY:  COUGH

 

COMPARISON:  Chest Single View dated 12/12/2018; Chest Single View dated 5/22/2016

 

FINDINGS:  Lines: None.

Lungs: No evidence of edema or pneumonia.

Pleural: No significant pleural effusions or pneumothorax.

Cardiac: The heart size is within normal limits.

Bones: No acute fractures.

Other:

 

IMPRESSION:  No acute cardiopulmonary disease.

## 2022-05-05 NOTE — XMS REPORT
Continuity of Care Document

                             Created on:May 5, 2022



Patient:DULCE MIRANDA

Sex:Female

:1970

External Reference #:[id number]





Demographics







                          Address                   [address]



                                                    [place] [address]

 

                          Home Phone                [phone]

 

                          Work Phone                [phone]

 

                          Mobile Phone              [phone]

 

                          Email Address             [email or web address]

 

                          Preferred Language        English

 

                          Marital Status            Unknown

 

                          Pentecostal Affiliation     Unknown

 

                          Race                      Unknown

 

                          Additional Race(s)        Unavailable



                                                    White

 

                          Ethnic Group               or 









Author







                          Organization              [facility]

t

 

                          Address                   [address]



                                                    [place] [address]

 

                          Phone                     [phone]









Support







                Name            Relationship    Address         Phone

 

                Benja Spouse    Spouse          [address]     [phone]



                                                [place] [address] 

 

                Sister Brett   Unavailable     Unavailable     [phone]

 

                Luera           Mother          Unavailable     Unavailable









Care Team Providers







                    Name                Role                Phone

 

                    YI Mclaughlin           Primary Care Physician [phone]

 

                    Doctor Unassigned,  Name Attending Clinician Unavailable









Payers







           Payer Name Policy Type Policy Number Effective Date Expiration Date S

ource







Problems







       Condition Condition Condition Status Onset  Resolution Last   Treating Co

mments 

Source



       Name   Details Category        Date   Date   Treatment Clinician        



                                                 Date                 

 

       Body mass Body mass Problem Active                                    [facility]



       index  index                                                   [facility]



       (BMI) of (BMI) of                                                  [facility]

a



       34.0-34.9 34.0-34.9                                                  l



       in adult in adult                                                  Outpat

i



                                                                      ent



                                                                      Clinics

 

       Encounter Encounter Problem Active                                    [facility]



       for    for                                                     [facility]



       screening screening                                                  [place]

aidan



       mammogram mammogram                                                  l



       for breast for breast                                                  Ou

tpati



       cancer cancer                                                  ent



                                                                      Clinics

 

       Other  Other  Problem Active                                    [facility]



       obesity obesity                                                  [facility]



       due to due to                                                  [facility]



       excess excess                                                  l



       calories calories                                                  Outpat

i



                                                                      ent



                                                                      Clinics

 

       Abnormal Abnormal Problem Active                                    [facility]

t



       mammogram mammogram                                                  [facility]

s -



                                                                      [facility]



                                                                      l



                                                                      Outpati



                                                                      ent



                                                                      Clinics

 

       Encounter Encounter Problem Active                                    [facility]



       for    for                                                     [facility]



       gynecologi gynecologi                                                  Me

moria



       tona    tona                                                     l



       examinatio examinatio                                                  Ou

tpati



       n without n without                                                  ent



       abnormal abnormal                                                  Clinic

s



       finding finding                                                  

 

       Medical Medical Problem Active                                    [facility]



       history history                                                  [facility]



       non-contri non-contri                                                  Me

moria



       butory butory                                                  l



                                                                      Outpati



                                                                      ent



                                                                      Clinics

 

       No known No known Disease                                           Unive

rs



       active active                                                  ity of



       problems problems                                                  [place]



                                                                      Medical



                                                                      Branch







Allergies, Adverse Reactions, Alerts

This patient has no known allergies or adverse reactions.



Social History







           Social Habit Start Date Stop Date  Quantity   Comments   Source

 

           Sex Assigned At 1970                       [facility]



           Birth      00:00:00   00:00:00                         Medical Branch









                Smoking Status  Start Date      Stop Date       Source

 

                Unknown if ever smoked                                 [facility]







Medications







       Ordered Filled Start  Stop   Current Ordering Indication Dosage Frequency

 Signature

                    Comments            Components          Source



     Medication Medication Date Date Medication? Clinician                (SIG) 

          



     Name Name                                                   

 

     dicyclomine      2018      Yes            20mg      Take 1           Univ

ers



     (BENTYL) 20      2-30                               tablet by           ity

 of



     mg tablet      00:00:                               mouth 3           [place]



               00                                 (three)           Medical



                                                  times           Branch



                                                  daily as           



                                                  needed for           



                                                  Abdominal           



                                                  pain.           

 

     ondansetron      2018      Yes            8mg       Take 1           Univ

ers



     (ZOFRAN      2-30                               tablet by           ity of



     ODT) 8 mg      00:00:                               mouth           [place]



     disintegrat      00                                 every 8           Medic

al



     ing tablet                                         (eight)           Branch



                                                  hours as           



                                                  needed for           



                                                  Nausea and           



                                                  Vomiting           



                                                  (N/V).           

 

     famotidine      2018      Yes            20mg      Take 1           Unive

rs



     (PEPCID) 20      2-30                               tablet by           ity

 of



     mg tablet      00:00:                               mouth           [place]



               00                                 daily.           Medical



                                                                 Branch

 

     lactobacill      2018      Yes            1{capsu      Take 1           U

nivers



     us comb      2-30                     le}       capsule by           ity of



     no.10      00:00:                               mouth           [place]



     (PROBIOTIC)      00                                 daily.           Medica

l



     20 billion                                                        Branch



     cell Cap                                                        

 

     Bactrim DS Bactrim DS 2018      Yes  Britney Long                1 tablet  

         [facility]



               2-28                                              [facility]



               00:00:                                              [facility]



               00                                                l



                                                                 Outpati



                                                                 ent



                                                                 Clinics







Procedures







                Procedure       Date / Time Performed Performing Clinician [facility]

e

 

                EXTERNAL PROVIDER - 2022-04-15 05:01:00 Doctor Unassigned, No [facility]                    Name            [facility] Branch







Encounters







        Start   End     Encounter Admission Attending Care    Care    Encounter 

Source



        Date/Time Date/Time Type    Type    Clinicians Facility Department ID   

   

 

        2022-04-15 2022-04-15 Orders          Doctor CHOWDHURY    [id number]

[address] [facility]



        00:00:00 00:00:00 Only            Unassigned, LAUREN   [id number]       

  ity of



                                        [facility] [id number]         [place]

as



                                                        [phone]         Medi

[place]



                                                        [address]             Branch

 

        2018 Outpatient                 [facility] [address]

[id number] [facility]



        08:53:00 08:53:00                         t [facility]         L

[facility]



                                                Care    [facility]

 

        2018 Outpatient                 [address]

[id number] [facility]



        14:00:00 14:00:00                         t Urgent Ur[facility] Care         L

[facility]

 

        2018 Outpatient                 [address]

[id number] [facility]



        09:25:00 09:25:00                         [facility]         [facility]

 

        2018-10-18 2018-10-18 Outpatient                 [address]

[id number] [facility]



        10:45:00 10:45:00                         t [facility]







Results

This patient has no known results.

## 2022-05-06 VITALS — OXYGEN SATURATION: 99 % | DIASTOLIC BLOOD PRESSURE: 73 MMHG | SYSTOLIC BLOOD PRESSURE: 111 MMHG | TEMPERATURE: 97.3 F

## 2022-05-06 LAB
BUN BLD-MCNC: 10 MG/DL (ref 7–18)
GLUCOSE SERPLBLD-MCNC: 78 MG/DL (ref 74–106)
HCT VFR BLD CALC: 35.1 % (ref 36–45)
LYMPHOCYTES # SPEC AUTO: 3.7 K/UL (ref 0.7–4.9)
PMV BLD: 8.3 FL (ref 7.6–11.3)
POTASSIUM SERPL-SCNC: 3.6 MMOL/L (ref 3.5–5.1)
RBC # BLD: 4.23 M/UL (ref 3.86–4.86)

## 2022-05-06 RX ADMIN — SODIUM CHLORIDE SCH MLS: 9 INJECTION, SOLUTION INTRAVENOUS at 06:00

## 2022-05-06 RX ADMIN — SODIUM CHLORIDE SCH MLS: 0.9 INJECTION, SOLUTION INTRAVENOUS at 05:32

## 2022-05-06 RX ADMIN — SODIUM CHLORIDE SCH MLS: 9 INJECTION, SOLUTION INTRAVENOUS at 00:00

## 2022-05-06 NOTE — EKG
Test Date:    2022-05-05               Test Time:    18:18:21

Technician:   CAROL                                     

                                                     

MEASUREMENT RESULTS:                                       

Intervals:                                           

Rate:         63                                     

OK:           166                                    

QRSD:         76                                     

QT:           434                                    

QTc:          444                                    

Axis:                                                

P:            42                                     

OK:           166                                    

QRS:          -3                                     

T:            22                                     

                                                     

INTERPRETIVE STATEMENTS:                                       

                                                     

Normal sinus rhythm

Possible Anterior infarct, age undetermined

Abnormal ECG

Compared to ECG 12/12/2018 14:35:43

Myocardial infarct finding now present

Sinus bradycardia no longer present



Electronically Signed On 05-06-22 14:32:18 CDT by Santana Wright

## 2022-05-06 NOTE — P.OP
Date of Service: 05/06/22


Preop diagnosis: Right breast abscess with cellulitis





Postop diagnosis: Same





Procedure performed: Incision, drainage and debridement right breast abscess, 

biopsy of the abscess cavity





Surgeon: Dylon Motta MD





Assistant: Payam BATRES





Estimated blood loss: Minimal





Specimen: Pus, abscess wall





Findings: As above





Anesthesia: General





Complications: None





Drains: Quarter inch Penrose drain





Fluids and blood products: Nonapplicable





Disposition: Recovery room





Operative note: Patient brought to the OR and placed in the supine position.  

General anesthesia begun.  Patient prepped and draped in usual sterile fashion. 

Marcaine 0.5% infiltrated for postop pain control.  15 blade used to make a 3 cm

incision over the fluctuant part of the abscess in the upper outer quadrant of 

the right breast.  Infected fluid under pressure evacuated.  Cultures done.  

Biopsy of the abscess wall done with a curette and sent to pathology.  Wound 

irrigated bleeding and controlled with cautery.  Quarter inch Penrose drain 

placed and secured with 3-0 nylon.  3-0 chromic used to reapproximate 

subcutaneous tissue and closed skin loosely.  Sterile dressing applied.  Patient

awakened and taken to recovery room in good general condition.





CC: Dr. Louis's office